# Patient Record
Sex: MALE | Race: OTHER | HISPANIC OR LATINO | URBAN - METROPOLITAN AREA
[De-identification: names, ages, dates, MRNs, and addresses within clinical notes are randomized per-mention and may not be internally consistent; named-entity substitution may affect disease eponyms.]

---

## 2017-03-09 ENCOUNTER — OUTPATIENT (OUTPATIENT)
Dept: OUTPATIENT SERVICES | Facility: HOSPITAL | Age: 27
LOS: 1 days | Discharge: HOME | End: 2017-03-09

## 2017-06-27 DIAGNOSIS — D68.8 OTHER SPECIFIED COAGULATION DEFECTS: ICD-10-CM

## 2019-07-27 ENCOUNTER — TRANSCRIPTION ENCOUNTER (OUTPATIENT)
Age: 29
End: 2019-07-27

## 2019-10-17 ENCOUNTER — TRANSCRIPTION ENCOUNTER (OUTPATIENT)
Age: 29
End: 2019-10-17

## 2019-12-06 ENCOUNTER — EMERGENCY (EMERGENCY)
Facility: HOSPITAL | Age: 29
LOS: 0 days | Discharge: HOME | End: 2019-12-06
Admitting: EMERGENCY MEDICINE
Payer: MEDICAID

## 2019-12-06 VITALS
OXYGEN SATURATION: 100 % | WEIGHT: 214.95 LBS | RESPIRATION RATE: 19 BRPM | TEMPERATURE: 98 F | SYSTOLIC BLOOD PRESSURE: 132 MMHG | DIASTOLIC BLOOD PRESSURE: 81 MMHG | HEART RATE: 94 BPM
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
## 2019-12-06 DIAGNOSIS — S79.929A UNSPECIFIED INJURY OF UNSPECIFIED THIGH, INITIAL ENCOUNTER: ICD-10-CM
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
## 2019-12-06 DIAGNOSIS — S76.911A STRAIN OF UNSPECIFIED MUSCLES, FASCIA AND TENDONS AT THIGH LEVEL, RIGHT THIGH, INITIAL ENCOUNTER: ICD-10-CM

## 2019-12-06 PROCEDURE — 99283 EMERGENCY DEPT VISIT LOW MDM: CPT

## 2019-12-06 RX ORDER — METHOCARBAMOL 500 MG/1
1000 TABLET, FILM COATED ORAL ONCE
Refills: 0 | Status: COMPLETED | OUTPATIENT
Start: 2019-12-06 | End: 2019-12-06

## 2019-12-06 RX ORDER — KETOROLAC TROMETHAMINE 30 MG/ML
30 SYRINGE (ML) INJECTION ONCE
Refills: 0 | Status: DISCONTINUED | OUTPATIENT
Start: 2019-12-06 | End: 2019-12-06

## 2019-12-06 RX ADMIN — Medication 30 MILLIGRAM(S): at 01:41

## 2019-12-06 RX ADMIN — METHOCARBAMOL 1000 MILLIGRAM(S): 500 TABLET, FILM COATED ORAL at 01:42

## 2019-12-06 NOTE — ED PROVIDER NOTE - NSFOLLOWUPINSTRUCTIONS_ED_ALL_ED_FT
Muscle Strain    A muscle strain is an injury that occurs when a muscle is stretched beyond its normal length. Usually a small number of muscle fibers are torn when this happens. Muscle strain is rated in degrees. First-degree strains have the least amount of muscle fiber tearing and pain. Second-degree and third-degree strains have increasingly more tearing and pain.     Usually, recovery from muscle strain takes 1–2 weeks. Complete healing takes 5–6 weeks.     CAUSES  Muscle strain happens when a sudden, violent force placed on a muscle stretches it too far. This may occur with lifting, sports, or a fall.     RISK FACTORS  Muscle strain is especially common in athletes.     SIGNS AND SYMPTOMS  At the site of the muscle strain, there may be:    Pain.  Bruising.  Swelling.   Difficulty using the muscle due to pain or lack of normal function.     DIAGNOSIS  Your health care provider will perform a physical exam and ask about your medical history.    TREATMENT  Often, the best treatment for a muscle strain is resting, icing, and applying cold compresses to the injured area.      HOME CARE INSTRUCTIONS  Use the PRICE method of treatment to promote muscle healing during the first 2–3 days after your injury. The PRICE method involves:  Protecting the muscle from being injured again.  Restricting your activity and resting the injured body part.   Icing your injury. To do this, put ice in a plastic bag. Place a towel between your skin and the bag. Then, apply the ice and leave it on from 15–20 minutes each hour. After the third day, switch to moist heat packs.   Apply compression to the injured area with a splint or elastic bandage. Be careful not to wrap it too tightly. This may interfere with blood circulation or increase swelling.   Elevate the injured body part above the level of your heart as often as you can.  Only take over-the-counter or prescription medicines for pain, discomfort, or fever as directed by your health care provider.   Warming up prior to exercise helps to prevent future muscle strains.     SEEK MEDICAL CARE IF:  You have increasing pain or swelling in the injured area.  You have numbness, tingling, or a significant loss of strength in the injured area.     MAKE SURE YOU:  Understand these instructions.   Will watch your condition.  Will get help right away if you are not doing well or get worse.

## 2019-12-06 NOTE — ED PROVIDER NOTE - PHYSICAL EXAMINATION
GEN: Patient in no acute distress  MS: + tenderness to right thigh, Normal ROM. pulses 2 +. no calf tenderness or swelling.  SKIN: Warm, dry, no acute rashes. Good turgor  NEURO: Strength 5/5 with no sensory deficits. Steady gait.

## 2019-12-06 NOTE — ED PROVIDER NOTE - PATIENT PORTAL LINK FT
You can access the FollowMyHealth Patient Portal offered by [facility] by registering at the following website: [email or web address]. By joining [employer]’s FollowMyHealth portal, you will also be able to view your health information using other applications (apps) compatible with our system.

## 2019-12-06 NOTE — ED PROVIDER NOTE - CLINICAL SUMMARY MEDICAL DECISION MAKING FREE TEXT BOX
Pt with pain to quad muscle and patella tendon after playing basketball. exam normal. will give medications, and follow up with ortho and rehab

## 2019-12-06 NOTE — ED PROVIDER NOTE - OBJECTIVE STATEMENT
29 year old male with no pmhx presents with right thigh pain x 5 days. pain to quad muscle and radiates to right knee. pain began while playing basketball. has not taken medication for it. no swelling, redness, tingling, numbness or weakness to extremity.

## 2019-12-06 NOTE — ED PROVIDER NOTE - NS ED ROS FT
Review of Systems:  	•	CONSTITUTIONAL - no fever, no diaphoresis, no chills  	•	SKIN - no rash  	•	HEMATOLOGIC - no bleeding, no bruising  	•	MUSCULOSKELETAL - + right leg pain  	•	NEUROLOGIC - no weakness, no paresthesias

## 2019-12-06 NOTE — ED PROVIDER NOTE - CARE PROVIDER_API CALL
Moo Mcintosh (MD)  Orthopaedic Surgery  [address] [place] [address]  Phone: [phone]  Fax: [phone]  Follow Up Time:

## 2019-12-06 NOTE — ED ADULT NURSE NOTE - OBJECTIVE STATEMENT
The patient is a 29y Male complaining of thigh pain/injury.      no fever, no diaphoresis, no chills  		•	SKIN - no rash  		•	HEMATOLOGIC - no bleeding, no bruising  		•	MUSCULOSKELETAL - + right leg pain  	•	NEUROLOGIC - no weakness, no paresthesias

## 2019-12-06 NOTE — ED PROVIDER NOTE - NSFOLLOWUPCLINICS_GEN_ALL_ED_FT
[facility] ([facility])  Rehabilitation  [facility] [place] [place] [address]  Phone: [phone]  Fax:   Follow Up Time:

## 2019-12-23 ENCOUNTER — EMERGENCY (EMERGENCY)
Facility: HOSPITAL | Age: 29
LOS: 0 days | Discharge: HOME | End: 2019-12-23
Attending: EMERGENCY MEDICINE | Admitting: EMERGENCY MEDICINE
Payer: MEDICAID

## 2019-12-23 VITALS
OXYGEN SATURATION: 100 % | TEMPERATURE: 97 F | DIASTOLIC BLOOD PRESSURE: 92 MMHG | SYSTOLIC BLOOD PRESSURE: 140 MMHG | RESPIRATION RATE: 18 BRPM | HEART RATE: 66 BPM | WEIGHT: 199.96 LBS
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
## 2019-12-23 DIAGNOSIS — M79.651 PAIN IN RIGHT THIGH: ICD-10-CM

## 2019-12-23 DIAGNOSIS — M79.659 PAIN IN UNSPECIFIED THIGH: ICD-10-CM

## 2019-12-23 PROBLEM — Z78.9 OTHER SPECIFIED HEALTH STATUS: Chronic | Status: ACTIVE | Noted: 2019-12-06

## 2019-12-23 PROCEDURE — 73552 X-RAY EXAM OF FEMUR 2/>: CPT | Mod: 26,RT

## 2019-12-23 PROCEDURE — 99283 EMERGENCY DEPT VISIT LOW MDM: CPT

## 2019-12-23 RX ORDER — METHOCARBAMOL 500 MG/1
1500 TABLET, FILM COATED ORAL ONCE
Refills: 0 | Status: COMPLETED | OUTPATIENT
Start: 2019-12-23 | End: 2019-12-23

## 2019-12-23 RX ORDER — ACETAMINOPHEN 500 MG
650 TABLET ORAL ONCE
Refills: 0 | Status: COMPLETED | OUTPATIENT
Start: 2019-12-23 | End: 2019-12-23

## 2019-12-23 RX ADMIN — Medication 650 MILLIGRAM(S): at 02:52

## 2019-12-23 RX ADMIN — METHOCARBAMOL 1500 MILLIGRAM(S): 500 TABLET, FILM COATED ORAL at 02:52

## 2019-12-23 NOTE — ED ADULT TRIAGE NOTE - CHIEF COMPLAINT QUOTE
pt complaining of right leg pain that has been going on for about 2 weeks now. Pt sts the pain starts at his right buttocks and it shoots down the right leg.

## 2019-12-23 NOTE — ED PROVIDER NOTE - ATTENDING CONTRIBUTION TO CARE
I personally evaluated the patient. I reviewed the Resident’s or Physician Assistant’s note (as assigned above), and agree with the findings and plan except as documented in my note.     29 male here for evaluation of right thigh pain began with activity now persisting. No other symptoms.     PE: male in no distress. EXT: right thigh normal no bony deformities no warmth or cellulitis. Distally NVI. FROM otherwise    Impression: thigh pain    Plan: imaging, reevaluation, likely outpatient management

## 2019-12-23 NOTE — ED PROVIDER NOTE - CLINICAL SUMMARY MEDICAL DECISION MAKING FREE TEXT BOX
29 male here for leg strain had imaging without acute findings will discharge with outpatient management.

## 2019-12-23 NOTE — ED ADULT NURSE NOTE - CHPI ED NUR SYMPTOMS NEG
no chills/no fever/no tingling/no weakness/no dizziness/no decreased eating/drinking/no nausea/no vomiting

## 2019-12-23 NOTE — ED PROVIDER NOTE - PHYSICAL EXAMINATION
CONSTITUTIONAL: Well-developed; well-nourished; in no acute distress, speaking in full sentences  SKIN: warm, dry  HEAD: Normocephalic; atraumatic  EYES: PERRL, EOMI, no conjunctival erythema  ENT: No nasal discharge; airway clear, mucous membranes moist  NECK: Supple; non tender, FROM  CARD: extremities well perfused   RESP: no increased wob  EXT: moves all extremities, ambulates wo assistance No clubbing, cyanosis or edema. no tenderness to thigh, thighs equal in color, temp, size. no rashes or lesions seen.   NEURO: Alert, oriented, grossly unremarkable, no focal deficits, cn ii-xii grossly intact. le strength and sensation grossly intact  PSYCH: Cooperative, appropriate

## 2019-12-23 NOTE — ED PROVIDER NOTE - PATIENT PORTAL LINK FT
You can access the FollowMyHealth Patient Portal offered by [facility] by registering at the following website: [email or web address]. By joining [employer]’s FollowMyHealth portal, you will also be able to view your health information using other applications (apps) compatible with our system.

## 2019-12-23 NOTE — ED ADULT NURSE NOTE - NSIMPLEMENTINTERV_GEN_ALL_ED
Implemented All Universal Safety Interventions:  [place] to call system. Call bell, personal items and telephone within reach. Instruct patient to call for assistance. Room bathroom lighting operational. Non-slip footwear when patient is off stretcher. Physically safe environment: no spills, clutter or unnecessary equipment. Stretcher in lowest position, wheels locked, appropriate side rails in place.

## 2019-12-23 NOTE — ED PROVIDER NOTE - NSFOLLOWUPCLINICS_GEN_ALL_ED_FT
[facility] ([facility])  Rehabilitation  [facility] [address], [address] [place] [address]  Phone: [phone]  Fax:   Follow Up Time:

## 2019-12-23 NOTE — ED PROVIDER NOTE - OBJECTIVE STATEMENT
28yo m no sig pmhx presents CC 3 weeks of R thigh pain which began while playing basketball, described as intermittent shooting pains, non radiating, no alleviating or exacerbating factors. denies injury or trauma. no weakness, numbness, tingling. no le edema.

## 2019-12-23 NOTE — ED ADULT NURSE NOTE - OBJECTIVE STATEMENT
patient complaints of right leg pain and the feeling of heat on the leg x 2 weeks. Patient denies any injury. Patient denies injury.

## 2019-12-23 NOTE — ED PROVIDER NOTE - NSFOLLOWUPINSTRUCTIONS_ED_ALL_ED_FT
Follow up with your primary care doctor and/or the doctors recommended in 1-3 days      Strain    A strain is a stretch or tear in one of the muscles in your body. This is caused by an injury to the area such as a twisting mechanism. Symptoms include pain, swelling, or bruising. Rest that area over the next several days and slowly resume activity when tolerated. Ice can help with swelling and pain.     SEEK IMMEDIATE MEDICAL CARE IF YOU HAVE ANY OF THE FOLLOWING SYMPTOMS: worsening pain, inability to move that body part, numbness or tingling.

## 2020-07-06 ENCOUNTER — EMERGENCY (EMERGENCY)
Facility: HOSPITAL | Age: 30
LOS: 0 days | Discharge: HOME | End: 2020-07-06
Attending: EMERGENCY MEDICINE | Admitting: EMERGENCY MEDICINE
Payer: MEDICAID

## 2020-07-06 VITALS
RESPIRATION RATE: 18 BRPM | DIASTOLIC BLOOD PRESSURE: 98 MMHG | HEART RATE: 74 BPM | TEMPERATURE: 99 F | HEIGHT: 71 IN | OXYGEN SATURATION: 99 % | SYSTOLIC BLOOD PRESSURE: 136 MMHG | WEIGHT: 220.02 LBS

## 2020-07-06 DIAGNOSIS — K12.2 CELLULITIS AND ABSCESS OF MOUTH: ICD-10-CM

## 2020-07-06 DIAGNOSIS — J02.9 ACUTE PHARYNGITIS, UNSPECIFIED: ICD-10-CM

## 2020-07-06 PROCEDURE — 99283 EMERGENCY DEPT VISIT LOW MDM: CPT

## 2020-07-06 RX ORDER — CLARITHROMYCIN 500 MG
1 TABLET ORAL
Qty: 1 | Refills: 0
Start: 2020-07-06 | End: 2020-07-10

## 2020-07-06 NOTE — ED PROVIDER NOTE - PATIENT PORTAL LINK FT
You can access the FollowMyHealth Patient Portal offered by [facility] by registering at the following website: [email or web address]. By joining [employer]’s FollowMyHealth portal, you will also be able to view your health information using other applications (apps) compatible with our system.

## 2020-07-06 NOTE — ED PROVIDER NOTE - NSFOLLOWUPINSTRUCTIONS_ED_ALL_ED_FT
Uvulitis     Uvulitis is inflammation of the uvula. The uvula is the small, finger-like piece of tissue that hangs down at the back of the throat. Uvulitis causes swelling and soreness of the uvula. It can also cause other symptoms, depending on the cause and severity of the condition.  What are the causes?  This condition may be caused by:  An infection in the mouth or throat. This is the most common cause.Trauma or injury to the uvula. Causes of trauma include burning your mouth, damage from a medical procedure, and heavy snoring.Fluid buildup (edema). Edema can be triggered by an allergic reaction. Uvulitis that is caused by edema is called Quincke disease.Inhaling chemicals, smoke, steam, or other substances that irritate the body.What are the signs or symptoms?  Symptoms of this condition depend on the cause.  Symptoms of uvulitis that is caused by infection include:  Red, swollen uvula.Sore throat.Fever.Headache.Swollen neck glands.Symptoms of uvulitis that is caused by trauma, edema, or irritation include:  Red, swollen uvula.Sore throat.Trouble swallowing.Choking or gagging.Trouble breathing.How is this diagnosed?  This condition is diagnosed with a physical exam. You may also have tests, such as a throat culture or blood tests, to help find the cause of the condition.  How is this treated?  Treatment for this condition depends on the cause. Treatment may involve:  Antibiotic medicine for a bacterial infection.Steroid medicine if the condition is caused by edema.Surgery to remove part of the uvula (partial uvulectomy). Surgery is only needed in rare cases where the swelling does not go away after trying other treatments.Follow these instructions at home:     Medicines     Take over-the-counter and prescription medicines only as told by your health care provider.If you were prescribed an antibiotic medicine, take it as told by your health care provider. Do not stop taking the antibiotic even if you start to feel better.Managing pain and soreness        Use a cool-mist humidifier to add moisture to the air. This can help ease irritation in your throat.While your throat is sore:  Eat a diet of soft foods or liquids.Gargle with a salt-water mixture 3–4 times a day or as needed. To make a salt-water mixture, completely dissolve ½–1 tsp of salt in 1 cup of warm water.General instructions     Drink enough fluid to keep your urine pale yellow.Keep all follow-up visits as told by your health care provider. This is important.Contact a health care provider if:  You have a fever.You have trouble eating.Your symptoms do not get better.Your symptoms come back after treatment.Get help right away if:  You have trouble breathing.You have trouble swallowing.Summary  Uvulitis is inflammation of the uvula, which is the small, finger-like piece of tissue that hangs down at the back of the throat.Uvulitis causes swelling and soreness of the uvula.This condition may be treated with antibiotics or steroids. In rare cases, surgery may be needed.This information is not intended to replace advice given to you by your health care provider. Make sure you discuss any questions you have with your health care provider.

## 2020-07-06 NOTE — ED PROVIDER NOTE - CARE PROVIDER_API CALL
[address] [place] [address]  Phone: [phone]  Fax: [phone]  Follow Up Time: 4-6 Days

## 2020-07-06 NOTE — ED PROVIDER NOTE - OBJECTIVE STATEMENT
28 y/o male denies PMH / PSH c/o sore throat x yesterday, constant, denies radiation, worse with swallowing and cough, denies fever or other associated complaints at present.    Old chart reviewed.  I have reviewed and agree with the nursing note, except as documented in my note.

## 2020-07-06 NOTE — ED PROVIDER NOTE - NS ED ROS FT
Constitutional: No fever, chills.  Cardiac: No chest pain, SOB or edema.  Respiratory: No cough or respiratory distress.   GI: No nausea, vomiting, diarrhea or abdominal pain.  Except as documented in the HPI, all other systems are negative.

## 2020-07-06 NOTE — ED PROVIDER NOTE - PHYSICAL EXAMINATION
VSS, awake, alert, non-toxic appearing, in NAD, the uvula is erythematous and enlarged, otherwise the pharynx is non-erythematous and tonsils appear normal, no exudates, there is no peritonsillar swelling, the submandibular space is neither swollen nor painful, no airway compromise, able to swallow and there is no drooling, voice is normal, mouth, lips, gingiva are moist and without trauma, uvula is midline, no stridor, no anterior or posterior cervical lymphadenopathy, no skin rash or lesions, clear speech and steady gait.

## 2022-05-18 ENCOUNTER — NON-APPOINTMENT (OUTPATIENT)
Age: 32
End: 2022-05-18

## 2022-06-15 ENCOUNTER — EMERGENCY (EMERGENCY)
Facility: HOSPITAL | Age: 32
LOS: 0 days | Discharge: HOME | End: 2022-06-16
Attending: EMERGENCY MEDICINE | Admitting: EMERGENCY MEDICINE
Payer: MEDICAID

## 2022-06-15 VITALS
WEIGHT: 218.04 LBS | HEART RATE: 76 BPM | HEIGHT: 71 IN | SYSTOLIC BLOOD PRESSURE: 150 MMHG | RESPIRATION RATE: 20 BRPM | DIASTOLIC BLOOD PRESSURE: 74 MMHG | TEMPERATURE: 98 F | OXYGEN SATURATION: 98 %

## 2022-06-15 DIAGNOSIS — S62.622A DISPLACED FRACTURE OF MIDDLE PHALANX OF RIGHT MIDDLE FINGER, INITIAL ENCOUNTER FOR CLOSED FRACTURE: ICD-10-CM
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
## 2022-06-15 DIAGNOSIS — M79.644 PAIN IN RIGHT FINGER(S): ICD-10-CM
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
## 2022-06-15 PROCEDURE — 99284 EMERGENCY DEPT VISIT MOD MDM: CPT

## 2022-06-16 PROCEDURE — 73130 X-RAY EXAM OF HAND: CPT | Mod: 26,RT

## 2022-06-16 NOTE — ED PROVIDER NOTE - PATIENT PORTAL LINK FT
You can access the FollowMyHealth Patient Portal offered by [facility] by registering at the following website: [email or web address]. By joining [employer]’s FollowMyHealth portal, you will also be able to view your health information using other applications (apps) compatible with our system.

## 2022-06-16 NOTE — ED PROVIDER NOTE - CARE PROVIDER_API CALL
Satish Weiss)  Orthopaedic Surgery  [address] [place] [address]  Phone: [phone]  Fax: [phone]  Follow Up Time: 7-10 Days

## 2022-06-16 NOTE — ED PROVIDER NOTE - NS ED ROS FT
Constitutional: no fever, chills, no recent weight loss, change in appetite or malaise  Cardiac: No chest pain, SOB or edema.  Respiratory: No cough or respiratory distress  GI: No nausea, vomiting, diarrhea or abdominal pain.  : No dysuria, frequency, urgency or hematuria  MS: see HPI  Neuro: No headache or weakness. No LOC.  Skin: No bruising, contusion, redness, good cap refill  Except as documented in the HPI, all other systems are negative.

## 2022-06-16 NOTE — ED PROVIDER NOTE - OBJECTIVE STATEMENT
32 yo healthy male here for assessment of R 3rd digit pain -- pain began acutely while playing basketball, jammed finger and noted it was "stuck." He is unsure what position it was stuck in, friend pulled on it and it went back to normal.    Has pain at rest, worse pain with making a fist.     No other injuries.

## 2022-06-16 NOTE — ED PROVIDER NOTE - NSFOLLOWUPINSTRUCTIONS_ED_ALL_ED_FT
Finger Fracture, Adult      A finger fracture is a break in any of the finger bones.      What are the causes?    The main cause of finger fractures is injury, such as from sports, a fall, or closing your finger in a drawer or door.      What increases the risk?    The following factors may make you more likely to develop this condition:  •Playing sports.      •Workplace activities that involve machinery.      •Having weak bones (osteoporosis). This condition makes your bones less dense and causes them to break easily.        What are the signs or symptoms?    The main symptoms of a fractured finger are pain, bruising, and swelling shortly after the injury. Other symptoms include:  •Stiffness.      •Exposed bones (compound fracture or open fracture), in severe cases.        How is this diagnosed?    This condition is diagnosed based on a physical exam, your medical history, and your symptoms. An X-ray will also be done to confirm the diagnosis.      How is this treated?    Treatment for this condition depends on the severity of the fracture. If the bones are still in place, the finger may be splinted to keep the finger still while it heals (immobilization). If several fingers are fractured, you may need a cast. A cast may be applied up to the elbow to keep your fingers and hand from moving.    If the bones are out of place, your health care provider may move them back into place manually or surgically.    You may also need to do physical therapy exercises to improve movement and strength in your finger.      Follow these instructions at home:      If you have a removable splint:   Hand showing finger splint on index and middle fingers and wrist.   •Wear the splint as told by your health care provider. Remove it only as told by your health care provider.      •Check the skin around the splint every day. Tell your health care provider about any concerns.      •Loosen the splint if your fingers tingle, become numb, or turn cold and blue.      •Keep the splint clean and dry.      If you have a nonremovable cast:     • Do not put pressure on any part of the cast until it is fully hardened. This may take several hours.      • Do not stick anything inside the cast to scratch your skin. Doing that increases your risk of infection.      •Check the skin around the cast every day. Tell your health care provider about any concerns.      •You may put lotion on dry skin around the edges of the cast. Do not put lotion on the skin underneath the cast.      •Keep the cast clean and dry.      Bathing     • Do not take baths, swim, or use a hot tub until your health care provider approves. Ask your health care provider if you may take showers.    •If your splint or cast is not waterproof:  •Do not let it get wet.      •Cover it with a watertight covering when you take a bath or shower.        Managing pain, stiffness, and swelling   •If directed, put ice on the injured area. To do this:  •If you have a removable splint, remove it as told by your health care provider.      •Put ice in a plastic bag.      •Place a towel between your skin and the bag, or between your cast and the bag.      •Leave the ice on for 20 minutes, 2–3 times a day.      •Remove the ice if your skin turns bright red. This is very important. If you cannot feel pain, heat, or cold, you have a greater risk of damage to the area.        •Move your fingers often to reduce stiffness and swelling.      •Raise (elevate) the injured area above the level of your heart while you are sitting or lying down.      Activity     •Ask your health care provider if the medicine prescribed to you requires you to avoid driving or using machinery.      •Do physical therapy exercises as told by your health care provider.      •Return to your normal activities as told by your health care provider. Ask your health care provider what activities are safe for you.      •Ask your health care provider when it is safe to drive if you have a splint or cast on your finger.      General instructions     • Do not use any products that contain nicotine or tobacco. These products include cigarettes, chewing tobacco, and vaping devices, such as e-cigarettes. These can delay bone healing. If you need help quitting, ask your health care provider.      •Take over-the-counter and prescription medicines only as told by your health care provider.      •Keep all follow-up visits. This is important.        Contact a health care provider if:    •Your pain or swelling gets worse, even with treatment.      •You have trouble moving your finger.        Get help right away if:    •Your finger becomes numb or blue.        Summary    •A finger fracture is a break in any of the finger bones.      •Injury is the main cause of finger fractures.      •Treatment for this condition depends on the severity of the fracture.      This information is not intended to replace advice given to you by your health care provider. Make sure you discuss any questions you have with your health care provider.

## 2022-06-16 NOTE — ED ADULT NURSE NOTE - OBJECTIVE STATEMENT
Pt is here for right hand 3rd digit pain after playing basketball. Pt said the finger hurts more when he's trying to move it. No other acute distress. Will continue to monitor pt.

## 2022-06-16 NOTE — ED PROVIDER NOTE - PHYSICAL EXAMINATION
VITAL SIGNS: I have reviewed nursing notes and confirm.  CONSTITUTIONAL: Well-developed; well-nourished; in no acute distress.  SKIN: Skin exam is warm and dry, no acute rash, no bruising, good cap refill  HEAD: Normocephalic; atraumatic.  EYES: PERRL, EOM intact; conjunctiva and sclera clear.  ENT: No nasal discharge; airway clear.   CARD: RRR, no murmur  RESP: No wheezes, rales or rhonchi.  ABD: Normal bowel sounds; soft; non-distended; non-tender  EXT: Normal ROM. pain with palpation of R 3rd PIP, pain with active and passive flexion, no deformity  NEURO: Alert, oriented. Grossly unremarkable. No focal deficits.  PSYCH: Cooperative, appropriate.

## 2022-08-26 ENCOUNTER — NON-APPOINTMENT (OUTPATIENT)
Age: 32
End: 2022-08-26

## 2022-12-06 ENCOUNTER — NON-APPOINTMENT (OUTPATIENT)
Age: 32
End: 2022-12-06

## 2022-12-09 ENCOUNTER — APPOINTMENT (OUTPATIENT)
Dept: ORTHOPEDIC SURGERY | Facility: CLINIC | Age: 32
End: 2022-12-09

## 2022-12-09 VITALS — HEIGHT: 71 IN

## 2022-12-09 VITALS — WEIGHT: 220 LBS | BODY MASS INDEX: 30.68 KG/M2

## 2022-12-09 PROCEDURE — 73140 X-RAY EXAM OF FINGER(S): CPT | Mod: 76,LT

## 2022-12-09 PROCEDURE — 99203 OFFICE O/P NEW LOW 30 MIN: CPT

## 2022-12-09 NOTE — HISTORY OF PRESENT ILLNESS
[de-identified] :  Patient is a 32-year-old male who reports office for evaluation of his left 5th finger pain since 12/04/2022.  He was playing basketball when he accidentally jammed his left 5th finger.  Since the injury, he admits that his finger tip has been deformed and downward drooping.  Range of motion and palpating certain areas of the finger aggravate the patient's pain.  Denies any numbness or tingling.

## 2022-12-09 NOTE — IMAGING
[de-identified] :  X-rays taken of the patient's left 5th finger in the office today revealed no obvious fractures, subluxations, or dislocations.  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

## 2022-12-09 NOTE — PHYSICAL EXAM
[Left] : left hand [Distal Phalanx] : distal phalanx [5th Finger] : 5th finger [5th] : 5th [DIP Joint] : DIP joint [5___] : pinch 5[unfilled]/5 [] : good capillary refill in all fingers [de-identified] :   Mallet deformity [FreeTextEntry9] :  unable to extend his 5th DIP

## 2022-12-09 NOTE — DISCUSSION/SUMMARY
[de-identified] :   Patient was placed in a Alumafoam splint with the 5th DIP in extension.  I instructed the patient to keep the splint on at all times and not to get it wet.  He understands and will comply.\par \par He will take OTC Tylenol or Motrin as needed for pain.  Patient will follow-up in 3 weeks for further evaluation.  All of the patient's questions/concerns were answered in detail.  \par \par The patient was seen under the supervision of Dr. Diaz\par

## 2022-12-25 ENCOUNTER — EMERGENCY (EMERGENCY)
Facility: HOSPITAL | Age: 32
LOS: 0 days | Discharge: HOME | End: 2022-12-26
Attending: EMERGENCY MEDICINE | Admitting: EMERGENCY MEDICINE
Payer: MEDICAID

## 2022-12-25 VITALS
HEART RATE: 66 BPM | DIASTOLIC BLOOD PRESSURE: 96 MMHG | OXYGEN SATURATION: 97 % | RESPIRATION RATE: 20 BRPM | TEMPERATURE: 97 F | SYSTOLIC BLOOD PRESSURE: 176 MMHG

## 2022-12-25 DIAGNOSIS — K52.9 NONINFECTIVE GASTROENTERITIS AND COLITIS, UNSPECIFIED: ICD-10-CM

## 2022-12-25 DIAGNOSIS — R10.9 UNSPECIFIED ABDOMINAL PAIN: ICD-10-CM

## 2022-12-25 DIAGNOSIS — K62.5 HEMORRHAGE OF ANUS AND RECTUM: ICD-10-CM

## 2022-12-25 DIAGNOSIS — K59.00 CONSTIPATION, UNSPECIFIED: ICD-10-CM

## 2022-12-25 PROCEDURE — 99285 EMERGENCY DEPT VISIT HI MDM: CPT

## 2022-12-25 NOTE — ED ADULT NURSE NOTE - PRO INTERPRETER NEED 2
LDL is 78 now so Dr. Raul Uriostegui does not think he'll get repatha at this point. Please recommend that he begin vascepa 2000mg BID in addition to livalo and then he'll need lipid panel checked with LDL direct in 3 months.
ldl not run by [employer] alejandra. Needs ldl direct retested
English

## 2022-12-25 NOTE — ED ADULT NURSE NOTE - CHIEF COMPLAINT QUOTE
Pt with C/O  abdomen pain N/V/D on with blood in the stool from today in AM . Pt with C/O  abdomen pain N/V/D on with baclood in the stool from today in AM .

## 2022-12-26 VITALS
RESPIRATION RATE: 19 BRPM | DIASTOLIC BLOOD PRESSURE: 74 MMHG | SYSTOLIC BLOOD PRESSURE: 141 MMHG | OXYGEN SATURATION: 98 % | HEART RATE: 71 BPM

## 2022-12-26 LAB
ALBUMIN SERPL ELPH-MCNC: 5 G/DL — SIGNIFICANT CHANGE UP (ref 3.5–5.2)
ALP SERPL-CCNC: 68 U/L — SIGNIFICANT CHANGE UP (ref 30–115)
ALT FLD-CCNC: 29 U/L — SIGNIFICANT CHANGE UP (ref 0–41)
ANION GAP SERPL CALC-SCNC: 12 MMOL/L — SIGNIFICANT CHANGE UP (ref 7–14)
AST SERPL-CCNC: 44 U/L — HIGH (ref 0–41)
BASOPHILS # BLD AUTO: 0.02 K/UL — SIGNIFICANT CHANGE UP (ref 0–0.2)
BASOPHILS NFR BLD AUTO: 0.2 % — SIGNIFICANT CHANGE UP (ref 0–1)
BILIRUB SERPL-MCNC: 0.9 MG/DL — SIGNIFICANT CHANGE UP (ref 0.2–1.2)
BUN SERPL-MCNC: 7 MG/DL — LOW (ref 10–20)
CALCIUM SERPL-MCNC: 9.5 MG/DL — SIGNIFICANT CHANGE UP (ref 8.4–10.5)
CHLORIDE SERPL-SCNC: 103 MMOL/L — SIGNIFICANT CHANGE UP (ref 98–110)
CO2 SERPL-SCNC: 24 MMOL/L — SIGNIFICANT CHANGE UP (ref 17–32)
CREAT SERPL-MCNC: 0.9 MG/DL — SIGNIFICANT CHANGE UP (ref 0.7–1.5)
EGFR: 116 ML/MIN/1.73M2 — SIGNIFICANT CHANGE UP
EOSINOPHIL # BLD AUTO: 0.03 K/UL — SIGNIFICANT CHANGE UP (ref 0–0.7)
EOSINOPHIL NFR BLD AUTO: 0.3 % — SIGNIFICANT CHANGE UP (ref 0–8)
GLUCOSE SERPL-MCNC: 102 MG/DL — HIGH (ref 70–99)
HCT VFR BLD CALC: 43.9 % — SIGNIFICANT CHANGE UP (ref 42–52)
HGB BLD-MCNC: 15.4 G/DL — SIGNIFICANT CHANGE UP (ref 14–18)
IMM GRANULOCYTES NFR BLD AUTO: 0.4 % — HIGH (ref 0.1–0.3)
LACTATE SERPL-SCNC: 1.4 MMOL/L — SIGNIFICANT CHANGE UP (ref 0.7–2)
LIDOCAIN IGE QN: 32 U/L — SIGNIFICANT CHANGE UP (ref 7–60)
LYMPHOCYTES # BLD AUTO: 19.2 % — LOW (ref 20.5–51.1)
LYMPHOCYTES # BLD AUTO: 2.1 K/UL — SIGNIFICANT CHANGE UP (ref 1.2–3.4)
MCHC RBC-ENTMCNC: 30.7 PG — SIGNIFICANT CHANGE UP (ref 27–31)
MCHC RBC-ENTMCNC: 35.1 G/DL — SIGNIFICANT CHANGE UP (ref 32–37)
MCV RBC AUTO: 87.6 FL — SIGNIFICANT CHANGE UP (ref 80–94)
MONOCYTES # BLD AUTO: 0.82 K/UL — HIGH (ref 0.1–0.6)
MONOCYTES NFR BLD AUTO: 7.5 % — SIGNIFICANT CHANGE UP (ref 1.7–9.3)
NEUTROPHILS # BLD AUTO: 7.92 K/UL — HIGH (ref 1.4–6.5)
NEUTROPHILS NFR BLD AUTO: 72.4 % — SIGNIFICANT CHANGE UP (ref 42.2–75.2)
NRBC # BLD: 0 /100 WBCS — SIGNIFICANT CHANGE UP (ref 0–0)
PLATELET # BLD AUTO: 306 K/UL — SIGNIFICANT CHANGE UP (ref 130–400)
POTASSIUM SERPL-MCNC: 4.9 MMOL/L — SIGNIFICANT CHANGE UP (ref 3.5–5)
POTASSIUM SERPL-SCNC: 4.9 MMOL/L — SIGNIFICANT CHANGE UP (ref 3.5–5)
PROT SERPL-MCNC: 7.6 G/DL — SIGNIFICANT CHANGE UP (ref 6–8)
RBC # BLD: 5.01 M/UL — SIGNIFICANT CHANGE UP (ref 4.7–6.1)
RBC # FLD: 12.6 % — SIGNIFICANT CHANGE UP (ref 11.5–14.5)
SODIUM SERPL-SCNC: 139 MMOL/L — SIGNIFICANT CHANGE UP (ref 135–146)
WBC # BLD: 10.93 K/UL — HIGH (ref 4.8–10.8)
WBC # FLD AUTO: 10.93 K/UL — HIGH (ref 4.8–10.8)

## 2022-12-26 PROCEDURE — 74177 CT ABD & PELVIS W/CONTRAST: CPT | Mod: 26,MA

## 2022-12-26 RX ORDER — FAMOTIDINE 10 MG/ML
20 INJECTION INTRAVENOUS ONCE
Refills: 0 | Status: COMPLETED | OUTPATIENT
Start: 2022-12-26 | End: 2022-12-26

## 2022-12-26 RX ORDER — METRONIDAZOLE 500 MG
1 TABLET ORAL
Qty: 30 | Refills: 0
Start: 2022-12-26 | End: 2023-01-04

## 2022-12-26 RX ORDER — CIPROFLOXACIN LACTATE 400MG/40ML
1 VIAL (ML) INTRAVENOUS
Qty: 20 | Refills: 0
Start: 2022-12-26 | End: 2023-01-04

## 2022-12-26 RX ORDER — MORPHINE SULFATE 50 MG/1
6 CAPSULE, EXTENDED RELEASE ORAL ONCE
Refills: 0 | Status: DISCONTINUED | OUTPATIENT
Start: 2022-12-26 | End: 2022-12-26

## 2022-12-26 RX ADMIN — MORPHINE SULFATE 6 MILLIGRAM(S): 50 CAPSULE, EXTENDED RELEASE ORAL at 00:14

## 2022-12-26 RX ADMIN — FAMOTIDINE 20 MILLIGRAM(S): 10 INJECTION INTRAVENOUS at 00:14

## 2022-12-26 NOTE — ED PROVIDER NOTE - NS ED ROS FT
Review of Systems    Constitutional: (-) fever  Eyes/ENT: (-) blurry vision, (-) epistaxis  Cardiovascular: (-) chest pain, (-) syncope  Respiratory: (-) cough, (-) shortness of breath  Gastrointestinal:+ abd pain  Musculoskeletal: (-) neck pain, (-) back pain, (-) joint pain  Integumentary: (-) rash, (-) edema  Neurological: (-) headache, (-) altered mental status  Psychiatric: (-) hallucinations  Allergic/Immunologic: (-) pruritus  All other systems are negative except as mentioned above

## 2022-12-26 NOTE — ED PROVIDER NOTE - PATIENT PORTAL LINK FT
You can access the FollowMyHealth Patient Portal offered by [facility] by registering at the following website: [email or web address]. By joining [employer]’s FollowMyHealth portal, you will also be able to view your health information using other applications (apps) compatible with our system.

## 2022-12-26 NOTE — ED PROVIDER NOTE - OBJECTIVE STATEMENT
32-year-old male no skin past posterior fracture abdominal pain.  Patient reports yesterday started feeling sharp bilateral mid abdominal pain.  Patient gives of a laxative as he was feeling constipated and afterwards had having bowel movement.  However now is having bright red blood per rectum.  Also complaining of persistent pain any fever chills lightheadedness. 32-year-old male w/no sig pmhx here for eval of abdominal pain.  Patient reports yesterday started feeling sharp bilateral mid abdominal pain.  Patient gives of a laxative as he was feeling constipated and afterwards had having bowel movement.  However now is having bright red blood per rectum.  Also complaining of persistent pain any fever chills lightheadedness.

## 2022-12-26 NOTE — ED PROVIDER NOTE - PROGRESS NOTE DETAILS
pt ok with rectal exam after Ct scan pt with colitis likely explanation of blood per rectum defers rectal exam

## 2022-12-26 NOTE — ED PROVIDER NOTE - CLINICAL SUMMARY MEDICAL DECISION MAKING FREE TEXT BOX
Patient presented with abdominal pain and bright red blood per rectum.  Found to have colitis given antibiotics and outpatient GI follow-up.

## 2022-12-26 NOTE — ED PROVIDER NOTE - PHYSICAL EXAMINATION
CON: ao x 3, HENMT: neck supple,  CV: s1s2 ctab, RESP: cta b/l, GI:  soft, non distended, mild diffuse ttp w/o r/g. SKIN: no rash, MSK: no deformities, NEURO: no gross motor or sensory deficit Psychiatric: appropriate mood, appropriate affect

## 2022-12-26 NOTE — ED PROVIDER NOTE - CARE PROVIDER_API CALL
Tita Vences (MD)  Gastroenterology  [address] [place] [address]  Phone: [phone]  Fax: [phone]  Follow Up Time:

## 2022-12-26 NOTE — ED PROVIDER NOTE - NSFOLLOWUPINSTRUCTIONS_ED_ALL_ED_FT
Colitis    Colitis is inflammation of the colon. Colitis may last a short time (acute) or it may last a long time (chronic).     CAUSES  This condition may be caused by:    Viruses.  Bacteria.  Reactions to medicine.  Certain autoimmune diseases, such as Crohn disease or ulcerative colitis.    SYMPTOMS  Symptoms of this condition include:    Diarrhea.  Passing bloody or tarry stool.  Pain.  Fever.  Vomiting.  Tiredness (fatigue).  Weight loss.  Bloating.  Sudden increase in abdominal pain.  Having fewer bowel movements than usual.    DIAGNOSIS  This condition is diagnosed with a stool test or a blood test. You may also have other tests, including X-rays, a CT scan, or a colonoscopy.    TREATMENT  Treatment may include:    Resting the bowel. This involves not eating or drinking for a period of time.  Fluids that are given through an IV tube.  Medicine for pain and diarrhea.  Antibiotic medicines.  Cortisone medicines.  Surgery.    HOME CARE INSTRUCTIONS  Eating and Drinking    Follow instructions from your health care provider about eating or drinking restrictions.  Drink enough fluid to keep your urine clear or pale yellow.  Work with a dietitian to determine which foods cause your condition to flare up.  Avoid foods that cause flare-ups.  Eat a well-balanced diet.    Medicines    Take over-the-counter and prescription medicines only as told by your health care provider.  If you were prescribed an antibiotic medicine, take it as told by your health care provider. Do not stop taking the antibiotic even if you start to feel better.    General Instructions    Keep all follow-up visits as told by your health care provider. This is important.    SEEK MEDICAL CARE IF:  Your symptoms do not go away.  You develop new symptoms.    SEEK IMMEDIATE MEDICAL CARE IF:  You have a fever that does not go away with treatment.  You develop chills.  You have extreme weakness, fainting, or dehydration.  You have repeated vomiting.  You develop severe pain in your abdomen.  You pass bloody or tarry stool.    ADDITIONAL NOTES AND INSTRUCTIONS    Please follow up with your Primary MD in 24-48 hr.  Seek immediate medical care for any new/worsening signs or symptoms. Our Emergency Department Referral Coordinators will be reaching out to you in the next 24-48 hours from 9:00am to 5:00pm with a follow up appointment. Please expect a phone call from the hospital in that time frame. If you do not receive a call or if you have any questions or concerns, you can reach them at [phone] or [phone].    Colitis    Colitis is inflammation of the colon. Colitis may last a short time (acute) or it may last a long time (chronic).     CAUSES  This condition may be caused by:    Viruses.  Bacteria.  Reactions to medicine.  Certain autoimmune diseases, such as Crohn disease or ulcerative colitis.    SYMPTOMS  Symptoms of this condition include:    Diarrhea.  Passing bloody or tarry stool.  Pain.  Fever.  Vomiting.  Tiredness (fatigue).  Weight loss.  Bloating.  Sudden increase in abdominal pain.  Having fewer bowel movements than usual.    DIAGNOSIS  This condition is diagnosed with a stool test or a blood test. You may also have other tests, including X-rays, a CT scan, or a colonoscopy.    TREATMENT  Treatment may include:    Resting the bowel. This involves not eating or drinking for a period of time.  Fluids that are given through an IV tube.  Medicine for pain and diarrhea.  Antibiotic medicines.  Cortisone medicines.  Surgery.    HOME CARE INSTRUCTIONS  Eating and Drinking    Follow instructions from your health care provider about eating or drinking restrictions.  Drink enough fluid to keep your urine clear or pale yellow.  Work with a dietitian to determine which foods cause your condition to flare up.  Avoid foods that cause flare-ups.  Eat a well-balanced diet.    Medicines    Take over-the-counter and prescription medicines only as told by your health care provider.  If you were prescribed an antibiotic medicine, take it as told by your health care provider. Do not stop taking the antibiotic even if you start to feel better.    General Instructions    Keep all follow-up visits as told by your health care provider. This is important.    SEEK MEDICAL CARE IF:  Your symptoms do not go away.  You develop new symptoms.    SEEK IMMEDIATE MEDICAL CARE IF:  You have a fever that does not go away with treatment.  You develop chills.  You have extreme weakness, fainting, or dehydration.  You have repeated vomiting.  You develop severe pain in your abdomen.  You pass bloody or tarry stool.    ADDITIONAL NOTES AND INSTRUCTIONS    Please follow up with your Primary MD in 24-48 hr.  Seek immediate medical care for any new/worsening signs or symptoms.

## 2023-01-04 ENCOUNTER — APPOINTMENT (OUTPATIENT)
Dept: ORTHOPEDIC SURGERY | Facility: CLINIC | Age: 33
End: 2023-01-04
Payer: MEDICAID

## 2023-01-04 VITALS — WEIGHT: 220 LBS | HEIGHT: 71 IN | BODY MASS INDEX: 30.8 KG/M2

## 2023-01-04 PROCEDURE — 99213 OFFICE O/P EST LOW 20 MIN: CPT

## 2023-01-04 NOTE — DISCUSSION/SUMMARY
[de-identified] :   He is about 4 weeks status post the injury, however he has not been very compliant with the splint since the came off last week.\par I stressed the importance of him remaining in the splint in extension for a total of 6 weeks to let the tendon heal.\par I placed him in a new Alumafoam splint with the DIP joint in extension.\par I recommend he remains in the splint for a total of 6 weeks.\par I will see him back in 3 weeks for repeat evaluation.\par All questions were answered today.

## 2023-01-04 NOTE — PHYSICAL EXAM
[de-identified] :   Physical exam of his left pinky finger:  There is an extensor lag of the DIP joint.  Nontender over the MCP or PIP joint.  Minimally tender over the DIP joint.  He can flex and extend the MCP and PIP joint.  He cannot extend the DIP joint.  Sensory and motor are intact.

## 2023-01-04 NOTE — HISTORY OF PRESENT ILLNESS
[de-identified] :  Patient is a 32-year-old male here for repeat evaluation of his left pinky finger.  He is about 1 month status post a mallet injury.  He states that his splint got wet about a week ago he removed it.  He has not been wearing it since then.  There is still an extensor lag present.

## 2023-01-25 ENCOUNTER — APPOINTMENT (OUTPATIENT)
Dept: ORTHOPEDIC SURGERY | Facility: CLINIC | Age: 33
End: 2023-01-25
Payer: MEDICAID

## 2023-01-25 VITALS — BODY MASS INDEX: 30.8 KG/M2 | HEIGHT: 71 IN | WEIGHT: 220 LBS

## 2023-01-25 DIAGNOSIS — M20.012 MALLET FINGER OF LEFT FINGER(S): ICD-10-CM

## 2023-01-25 PROCEDURE — 99213 OFFICE O/P EST LOW 20 MIN: CPT

## 2023-01-25 NOTE — PHYSICAL EXAM
[de-identified] :   Physical exam of his left pinky finger:  There is a very mild lag of the DIP joint.  Nontender over the MCP or PIP joint.  Minimally tender over the DIP joint.  He can flex and extend the MCP, PIP, and DIP joint.  He cannot extend the DIP joint.  Sensory and motor are intact.

## 2023-01-25 NOTE — DISCUSSION/SUMMARY
[de-identified] : The patient has been much more compliant with his splint.\par There is a very mild extensor lag present at this time.\par He can flex and extend at the DIP joint.\par He will continue wearing the splint for another 3 weeks 24 hours a day 7 days a week.\par At that point we will convert him to nighttime splinting.\par All questions were answered today.

## 2023-01-25 NOTE — HISTORY OF PRESENT ILLNESS
[de-identified] :  Patient is a 32-year-old male here for repeat evaluation of his left pinky finger.  He is about 6 weeks status post his injury. He has been much more compliant with the splint since last appt.

## 2023-02-15 ENCOUNTER — APPOINTMENT (OUTPATIENT)
Dept: ORTHOPEDIC SURGERY | Facility: CLINIC | Age: 33
End: 2023-02-15

## 2023-06-19 NOTE — ED PROVIDER NOTE - NSTIMEPROVIDERCAREINITIATE_GEN_ER
25-Dec-2022 23:53 Terbinafine Pregnancy And Lactation Text: This medication is Pregnancy Category B and is considered safe during pregnancy. It is also excreted in breast milk and breast feeding isn't recommended.

## 2023-07-20 ENCOUNTER — APPOINTMENT (OUTPATIENT)
Dept: PAIN MANAGEMENT | Facility: CLINIC | Age: 33
End: 2023-07-20
Payer: MEDICAID

## 2023-07-20 VITALS
TEMPERATURE: 98.7 F | WEIGHT: 220 LBS | BODY MASS INDEX: 30.8 KG/M2 | SYSTOLIC BLOOD PRESSURE: 148 MMHG | HEIGHT: 71 IN | DIASTOLIC BLOOD PRESSURE: 89 MMHG | HEART RATE: 82 BPM

## 2023-07-20 DIAGNOSIS — M79.606 PAIN IN LEG, UNSPECIFIED: ICD-10-CM

## 2023-07-20 PROCEDURE — 99204 OFFICE O/P NEW MOD 45 MIN: CPT

## 2023-07-20 NOTE — PHYSICAL EXAM
[Normal Coordination] : normal coordination [Normal Sensation] : normal sensation [Normal Mood and Affect] : normal mood and affect [Orientated] : orientated [Able to Communicate] : able to communicate [Well Developed] : well developed [Well Nourished] : well nourished [] : patient ambulates without assistive device

## 2023-07-20 NOTE — DATA REVIEWED
[No studies available for review at this time.] : No studies available for review at this time. [FreeTextEntry1] : SOAPP \par \par Low risk: Low risk SOAPP

## 2023-07-20 NOTE — ASSESSMENT
[FreeTextEntry1] : Mr. Blackmon presents today as a 32 year old man for initial consult. He presents with a chief complaint of sharp shooting pain in the bilateral lower extremities. He will begin physical therapy at this time for treatment of lower extremity pain. I am ordering an MRI of the lumbar spine to rule out spine etiology. I will see him back in 3 weeks.\par \par Lumbar MRI was ordered to delineate spine pathology such as disc displacement and stenosis\par \par I, Hilaria Du, attest that this documentation has been prepared under the direction and in the presence of Provider Luann Sanders MD.\par \par \par Thank you for allowing me to assist in the management of this patient. \par \par \par Best Regards, \par \par \par Luann Sanders M.D., [facility]\par \par \par Diplomate, American Board of Physical Medicine and Rehabilitation\par Diplomate, American Board of Pain Medicine \par Diplomate, American Board of Pain Management\par

## 2023-07-20 NOTE — HISTORY OF PRESENT ILLNESS
[FreeTextEntry1] : Mr. Blackmon presents today as a 32 year old man for initial consult. He presents with a chief complaint of sharp shooting pain in the bilateral lower extremities. Persists primarily near the lateral thigh/hip regions. He has been treating with hot water with no relief. Patient denies numbness and tingling. Patient denies lower back pain. He notes he does play basketball on a regular basis. He denies weakness. No injury or incident noted. \par \par ACTIVITIES: Pain increases when walking or standing for long periods of time.\par \par PRIOR PAIN TREATMENTS: Hot Epsom salt baths. Motrin\par \par No imaging available for review.

## 2023-08-08 ENCOUNTER — APPOINTMENT (OUTPATIENT)
Dept: MRI IMAGING | Facility: CLINIC | Age: 33
End: 2023-08-08

## 2023-08-15 ENCOUNTER — APPOINTMENT (OUTPATIENT)
Dept: MRI IMAGING | Facility: CLINIC | Age: 33
End: 2023-08-15
Payer: MEDICAID

## 2023-08-15 PROCEDURE — 72148 MRI LUMBAR SPINE W/O DYE: CPT

## 2023-08-16 ENCOUNTER — APPOINTMENT (OUTPATIENT)
Dept: PAIN MANAGEMENT | Facility: CLINIC | Age: 33
End: 2023-08-16

## 2024-09-10 ENCOUNTER — EMERGENCY (EMERGENCY)
Facility: HOSPITAL | Age: 34
LOS: 0 days | Discharge: HOME | End: 2024-09-10
Attending: STUDENT IN AN ORGANIZED HEALTH CARE EDUCATION/TRAINING PROGRAM
Payer: SELF-PAY

## 2024-09-10 VITALS
SYSTOLIC BLOOD PRESSURE: 155 MMHG | WEIGHT: 218.04 LBS | TEMPERATURE: 98 F | OXYGEN SATURATION: 98 % | HEART RATE: 66 BPM | RESPIRATION RATE: 17 BRPM | DIASTOLIC BLOOD PRESSURE: 79 MMHG | HEIGHT: 71 IN

## 2024-09-10 PROCEDURE — 29130 APPL FINGER SPLINT STATIC: CPT | Mod: F3

## 2024-09-10 PROCEDURE — 73130 X-RAY EXAM OF HAND: CPT | Mod: 26,LT

## 2024-09-10 PROCEDURE — 99283 EMERGENCY DEPT VISIT LOW MDM: CPT | Mod: 25

## 2024-09-10 PROCEDURE — 26720 TREAT FINGER FRACTURE EACH: CPT | Mod: 54,F3

## 2024-09-10 PROCEDURE — 73130 X-RAY EXAM OF HAND: CPT | Mod: LT

## 2024-09-10 PROCEDURE — 99284 EMERGENCY DEPT VISIT MOD MDM: CPT | Mod: 57

## 2024-09-10 RX ORDER — ACETAMINOPHEN 325 MG/1
975 TABLET ORAL ONCE
Refills: 0 | Status: COMPLETED | OUTPATIENT
Start: 2024-09-10 | End: 2024-09-10

## 2024-09-10 RX ADMIN — ACETAMINOPHEN 975 MILLIGRAM(S): 325 TABLET ORAL at 21:20

## 2024-09-10 NOTE — ED PROVIDER NOTE - OBJECTIVE STATEMENT
33-year-old male no past medical history presents with left fourth finger pain since yesterday after playing basketball.  Patient states he had jammed finger that bent backwards when reaching for a ball.  Patient reports worsening swelling and pain today she came to ED.  Denies loss of strength and sensation.  Patient has no other complaints.

## 2024-09-10 NOTE — ED PROVIDER NOTE - CARE PROVIDER_API CALL
Satish Weiss  Orthopaedic Surgery  [address] [place] [address]  Phone: [phone]  Fax: [phone]  Follow Up Time:

## 2024-09-10 NOTE — ED ADULT NURSE NOTE - NSFALLUNIVINTERV_ED_ALL_ED
Bed/Stretcher in lowest position, wheels locked, appropriate side rails in place/Call bell, personal items and telephone in reach/Instruct patient to call for assistance before getting out of bed/chair/stretcher/Non-slip footwear applied when patient is off stretcher/[place] to call system/Physically safe environment - no spills, clutter or unnecessary equipment/Purposeful proactive rounding/Room/bathroom lighting operational, light cord in reach

## 2024-09-10 NOTE — ED PROVIDER NOTE - PHYSICAL EXAMINATION
Vital Signs: I have reviewed the initial vital signs.  CONSTITUTIONAL: Pt in no acute distress   SKIN: Skin exam is warm and dry, no acute rash.  HEAD: Normocephalic; atraumatic.  EYES: PERRL, EOM intact; conjunctiva and sclera clear.  ENT: No nasal discharge; airway clear.   NECK: Supple;   MSK:+swelling, ecchymosis, and tenderness to left proximal PIP 4th finger with limited ROM secondary to pain. NVI. Cap refill <2 sec.

## 2024-09-10 NOTE — ED PROVIDER NOTE - NSFOLLOWUPINSTRUCTIONS_ED_ALL_ED_FT
Take Tylenol 600 mg every 6 hours as needed for pain.  Follow-up with hand referral within 7 days.  Keep splint dry, intact.    Fracture    A fracture is a break in one of your bones. This can occur from a variety of injuries, especially traumatic ones. Symptoms include pain, bruising, or swelling. Do not use the injured limb. If a fracture is in one of the bones below your waist, do not put weight on that limb unless instructed to do so by your healthcare provider. Crutches or a cane may have been provided. A splint or cast may have been applied by your health care provider. Make sure to keep it dry and follow up with an orthopedist as instructed.    SEEK IMMEDIATE MEDICAL CARE IF YOU HAVE ANY OF THE FOLLOWING SYMPTOMS: numbness, tingling, increasing pain, or weakness in any part of the injured limb.

## 2024-09-10 NOTE — ED ADULT TRIAGE NOTE - CHIEF COMPLAINT QUOTE
"I jammed my left ring finger last night while playing basketball. It's very painful and the swelling seemed to have spread to my palm." Pt took Motrin 2 hrs PTA.

## 2024-09-10 NOTE — ED PROVIDER NOTE - PATIENT PORTAL LINK FT
You can access the FollowMyHealth Patient Portal offered by [facility] by registering at the following website: [email or web address]. By joining [employer]’s FollowMyHealth portal, you will also be able to view your health information using other applications (apps) compatible with our system.

## 2025-09-19 ENCOUNTER — EMERGENCY (EMERGENCY)
Facility: HOSPITAL | Age: 35
LOS: 0 days | Discharge: HOME | End: 2025-09-19
Attending: EMERGENCY MEDICINE
Payer: COMMERCIAL

## 2025-09-19 VITALS — HEIGHT: 71 IN | WEIGHT: 225.09 LBS

## 2025-09-19 VITALS
RESPIRATION RATE: 20 BRPM | DIASTOLIC BLOOD PRESSURE: 83 MMHG | HEART RATE: 62 BPM | TEMPERATURE: 98 F | OXYGEN SATURATION: 97 % | SYSTOLIC BLOOD PRESSURE: 126 MMHG

## 2025-09-19 DIAGNOSIS — M62.830 MUSCLE SPASM OF BACK: ICD-10-CM

## 2025-09-19 DIAGNOSIS — M54.2 CERVICALGIA: ICD-10-CM

## 2025-09-19 DIAGNOSIS — M79.651 PAIN IN RIGHT THIGH: ICD-10-CM

## 2025-09-19 DIAGNOSIS — M79.652 PAIN IN LEFT THIGH: ICD-10-CM
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
## 2025-09-19 DIAGNOSIS — M54.50 LOW BACK PAIN, UNSPECIFIED: ICD-10-CM

## 2025-09-19 DIAGNOSIS — M79.605 PAIN IN LEFT LEG: ICD-10-CM
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
## 2025-09-19 PROCEDURE — 99283 EMERGENCY DEPT VISIT LOW MDM: CPT | Mod: 25

## 2025-09-19 PROCEDURE — 99284 EMERGENCY DEPT VISIT MOD MDM: CPT

## 2025-09-19 PROCEDURE — 96372 THER/PROPH/DIAG INJ SC/IM: CPT

## 2025-09-19 RX ORDER — METHOCARBAMOL 500 MG/1
1500 TABLET, FILM COATED ORAL ONCE
Refills: 0 | Status: DISCONTINUED | OUTPATIENT
Start: 2025-09-19 | End: 2025-09-19

## 2025-09-19 RX ORDER — METHOCARBAMOL 500 MG/1
1 TABLET, FILM COATED ORAL
Qty: 12 | Refills: 0
Start: 2025-09-19 | End: 2025-09-21

## 2025-09-19 RX ORDER — KETOROLAC TROMETHAMINE 30 MG/ML
30 INJECTION, SOLUTION INTRAMUSCULAR; INTRAVENOUS ONCE
Refills: 0 | Status: DISCONTINUED | OUTPATIENT
Start: 2025-09-19 | End: 2025-09-19

## 2025-09-19 RX ORDER — LIDOCAINE HYDROCHLORIDE 20 MG/ML
1 JELLY TOPICAL ONCE
Refills: 0 | Status: COMPLETED | OUTPATIENT
Start: 2025-09-19 | End: 2025-09-19

## 2025-09-19 RX ADMIN — KETOROLAC TROMETHAMINE 30 MILLIGRAM(S): 30 INJECTION, SOLUTION INTRAMUSCULAR; INTRAVENOUS at 14:58

## 2025-09-19 RX ADMIN — LIDOCAINE HYDROCHLORIDE 1 PATCH: 20 JELLY TOPICAL at 14:58
